# Patient Record
Sex: MALE | Race: WHITE | NOT HISPANIC OR LATINO | Employment: FULL TIME | RURAL
[De-identification: names, ages, dates, MRNs, and addresses within clinical notes are randomized per-mention and may not be internally consistent; named-entity substitution may affect disease eponyms.]

---

## 2023-01-25 ENCOUNTER — TELEPHONE (OUTPATIENT)
Dept: FAMILY MEDICINE CLINIC | Facility: CLINIC | Age: 53
End: 2023-01-25

## 2023-01-25 NOTE — TELEPHONE ENCOUNTER
Caller: Yoanna Brady    Relationship: Self    Best call back number: [phone]    What form or medical record are you requesting: [facility] PAPERWORK WAS ORIGINALLY FAXED BETWEEN [facility] AND [facility] YEARS'    Who is requesting this form or medical record from you: CHRISTOS SENT PAPERWORK TO [facility]  PLEASE FAX       DAUGHTER GABBIE HAS CONCERNS AND DAD MUST HAVE PAPERWORK COMPLETED IN HIS NAME

## 2023-01-26 NOTE — TELEPHONE ENCOUNTER
I have the papers up here with me, I have attempted to reach out to pt mother to let them know we had faxed papers over and were ready for  but I never got a call back. Attempted to call Yoseph Jose Antonio and no answer as well. I will continue to try and reach out to them.

## 2023-02-16 ENCOUNTER — OFFICE VISIT (OUTPATIENT)
Dept: FAMILY MEDICINE CLINIC | Facility: CLINIC | Age: 53
End: 2023-02-16
Payer: COMMERCIAL

## 2023-02-16 VITALS
WEIGHT: 228 LBS | HEART RATE: 80 BPM | HEIGHT: 70 IN | TEMPERATURE: 99.3 F | DIASTOLIC BLOOD PRESSURE: 74 MMHG | OXYGEN SATURATION: 98 % | BODY MASS INDEX: 32.64 KG/M2 | SYSTOLIC BLOOD PRESSURE: 107 MMHG

## 2023-02-16 DIAGNOSIS — J01.00 ACUTE NON-RECURRENT MAXILLARY SINUSITIS: Primary | ICD-10-CM

## 2023-02-16 PROCEDURE — 99203 OFFICE O/P NEW LOW 30 MIN: CPT | Performed by: NURSE PRACTITIONER

## 2023-02-16 RX ORDER — PROMETHAZINE HYDROCHLORIDE AND CODEINE PHOSPHATE 6.25; 1 MG/5ML; MG/5ML
5 SOLUTION ORAL EVERY 4 HOURS PRN
Qty: 240 ML | Refills: 0 | Status: SHIPPED | OUTPATIENT
Start: 2023-02-16

## 2023-02-16 RX ORDER — METHYLPREDNISOLONE 4 MG/1
TABLET ORAL
Qty: 1 EACH | Refills: 0 | Status: SHIPPED | OUTPATIENT
Start: 2023-02-16

## 2023-02-16 NOTE — PROGRESS NOTES
"Chief Complaint   Patient presents with   • Cough     Started in ear and throat. No fever and started Monday.         Subjective   Yoanna Brady is a 52 y.o. male who presents today for head congestion and cough.    HPI   He started with head congestion and cough four days ago. He started taking mucinex yesterday but has not seen much improvement.     No Known Allergies      OBJECTIVE:  Vitals:    02/16/23 0809   BP: 107/74   BP Location: Left arm   Patient Position: Sitting   Cuff Size: Adult   Pulse: 80   Temp: 99.3 °F (37.4 °C)   SpO2: 98%   Weight: 103 kg (228 lb)   Height: 178 cm (70.08\")     Physical Exam  HENT:      Right Ear: Tympanic membrane is bulging.      Left Ear: Tympanic membrane is bulging.      Nose: Congestion and rhinorrhea present.      Mouth/Throat:      Pharynx: Pharyngeal swelling and posterior oropharyngeal erythema present.   Pulmonary:      Effort: Pulmonary effort is normal.      Breath sounds: Normal breath sounds.                    ASSESSMENT/ PLAN:    Diagnoses and all orders for this visit:    1. Acute non-recurrent maxillary sinusitis (Primary)  -     methylPREDNISolone (MEDROL) 4 MG dose pack; Take as directed on package instructions.  Dispense: 1 each; Refill: 0  -     promethazine-codeine (PHENERGAN with CODEINE) 6.25-10 MG/5ML solution; Take 5 mL by mouth Every 4 (Four) Hours As Needed for Cough.  Dispense: 240 mL; Refill: 0      Procedures     Management Plan:     An After Visit Summary was printed and given to the patient at discharge.    Follow-up: Return if symptoms worsen or fail to improve.         Surjit Baires, APRN 2/16/2023 08:27 CST  This note was electronically signed.          "